# Patient Record
Sex: FEMALE | Race: WHITE
[De-identification: names, ages, dates, MRNs, and addresses within clinical notes are randomized per-mention and may not be internally consistent; named-entity substitution may affect disease eponyms.]

---

## 2018-09-28 ENCOUNTER — HOSPITAL ENCOUNTER (EMERGENCY)
Dept: HOSPITAL 26 - MED | Age: 28
Discharge: HOME | End: 2018-09-28
Payer: COMMERCIAL

## 2018-09-28 VITALS — BODY MASS INDEX: 27.76 KG/M2 | HEIGHT: 60 IN | WEIGHT: 141.38 LBS

## 2018-09-28 VITALS — DIASTOLIC BLOOD PRESSURE: 71 MMHG | SYSTOLIC BLOOD PRESSURE: 105 MMHG

## 2018-09-28 VITALS — SYSTOLIC BLOOD PRESSURE: 105 MMHG | DIASTOLIC BLOOD PRESSURE: 71 MMHG
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
## 2018-09-28 DIAGNOSIS — J45.909: ICD-10-CM

## 2018-09-28 DIAGNOSIS — S93.401A: Primary | ICD-10-CM
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
## 2018-09-28 PROCEDURE — 73610 X-RAY EXAM OF ANKLE: CPT

## 2018-09-28 PROCEDURE — 99284 EMERGENCY DEPT VISIT MOD MDM: CPT

## 2018-09-28 PROCEDURE — 96372 THER/PROPH/DIAG INJ SC/IM: CPT

## 2018-09-28 NOTE — NUR
Patient discharged with v/s stable. Written and verbal after care instructions 
given and explained. 

Patient alert, oriented and verbalized understanding of instructions. 
Ambulatory with steady gait. All questions addressed prior to discharge. ID 
band removed. Patient advised to follow up with PMD. Rx of motrin and norco 
given. Patient educated on indication of medication including possible reaction 
and side effects. Opportunity to ask questions provided and answered.

## 2018-09-28 NOTE — NUR
PT C/O RT ANKLE PAIN S/P FALL ON A STAIRS LAST WEDNESDAY AND ROLLED HER RT 
FOOT; ABLE TO AMBULATE TO BED 8. PATIENT STATES PAIN OF 7/10 AT THIS TIME; VSS; 
PATIENT POSITIONED FOR COMFORT; HOB ELEVATED; BEDRAILS UP X1; BED DOWN. ER MD 
MADE AWARE OF PT STATUS.

## 2018-11-29 ENCOUNTER — HOSPITAL ENCOUNTER (EMERGENCY)
Dept: HOSPITAL 26 - MED | Age: 28
Discharge: HOME | End: 2018-11-29
Payer: COMMERCIAL

## 2018-11-29 VITALS — BODY MASS INDEX: 26.5 KG/M2 | WEIGHT: 135 LBS | HEIGHT: 60 IN

## 2018-11-29 VITALS — SYSTOLIC BLOOD PRESSURE: 106 MMHG | DIASTOLIC BLOOD PRESSURE: 54 MMHG
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
## 2018-11-29 DIAGNOSIS — R56.9: ICD-10-CM

## 2018-11-29 DIAGNOSIS — F17.210: ICD-10-CM

## 2018-11-29 DIAGNOSIS — S00.532A: Primary | ICD-10-CM

## 2018-11-29 DIAGNOSIS — J45.909: ICD-10-CM

## 2018-11-29 DIAGNOSIS — S00.01XA: ICD-10-CM

## 2018-11-29 LAB
ALBUMIN FLD-MCNC: 3.5 G/DL (ref 3.4–5)
ANION GAP SERPL CALCULATED.3IONS-SCNC: 13.4 MMOL/L (ref 8–16)
AST SERPL-CCNC: 37 U/L (ref 15–37)
BARBITURATES UR QL SCN: (no result) NG/ML
BASOPHILS # BLD AUTO: 0 K/UL (ref 0–0.22)
BASOPHILS NFR BLD AUTO: 0.3 % (ref 0–2)
BENZODIAZ UR QL SCN: (no result) NG/ML
BILIRUB SERPL-MCNC: 0.8 MG/DL (ref 0–1)
BUN SERPL-MCNC: 13 MG/DL (ref 7–18)
BZE UR QL SCN: (no result) NG/ML
CANNABINOIDS UR QL SCN: (no result) NG/ML
CHLORIDE SERPL-SCNC: 104 MMOL/L (ref 98–107)
CO2 SERPL-SCNC: 23.8 MMOL/L (ref 21–32)
CREAT SERPL-MCNC: 0.7 MG/DL (ref 0.6–1.3)
EOSINOPHIL # BLD AUTO: 0.1 K/UL (ref 0–0.4)
EOSINOPHIL NFR BLD AUTO: 1 % (ref 0–4)
ERYTHROCYTE [DISTWIDTH] IN BLOOD BY AUTOMATED COUNT: 13.2 % (ref 11.6–13.7)
GFR SERPL CREATININE-BSD FRML MDRD: 129 ML/MIN (ref 90–?)
GLUCOSE SERPL-MCNC: 86 MG/DL (ref 74–106)
HCT VFR BLD AUTO: 36.8 % (ref 36–48)
HGB BLD-MCNC: 12.4 G/DL (ref 12–16)
LYMPHOCYTES # BLD AUTO: 1.1 K/UL (ref 2.5–16.5)
LYMPHOCYTES NFR BLD AUTO: 17.2 % (ref 20.5–51.1)
MCH RBC QN AUTO: 30 PG (ref 27–31)
MCHC RBC AUTO-ENTMCNC: 34 G/DL (ref 33–37)
MCV RBC AUTO: 88.5 FL (ref 80–94)
MONOCYTES # BLD AUTO: 0.5 K/UL (ref 0.8–1)
MONOCYTES NFR BLD AUTO: 8 % (ref 1.7–9.3)
NEUTROPHILS # BLD AUTO: 4.8 K/UL (ref 1.8–7.7)
NEUTROPHILS NFR BLD AUTO: 73.5 % (ref 42.2–75.2)
OPIATES UR QL SCN: (no result) NG/ML
PCP UR QL SCN: (no result) NG/ML
PLATELET # BLD AUTO: 358 K/UL (ref 140–450)
POTASSIUM SERPL-SCNC: 4.2 MMOL/L (ref 3.5–5.1)
RBC # BLD AUTO: 4.15 MIL/UL (ref 4.2–5.4)
SODIUM SERPL-SCNC: 137 MMOL/L (ref 136–145)
WBC # BLD AUTO: 6.6 K/UL (ref 4.8–10.8)

## 2018-11-29 PROCEDURE — 80305 DRUG TEST PRSMV DIR OPT OBS: CPT

## 2018-11-29 PROCEDURE — 81002 URINALYSIS NONAUTO W/O SCOPE: CPT

## 2018-11-29 PROCEDURE — 70450 CT HEAD/BRAIN W/O DYE: CPT

## 2018-11-29 PROCEDURE — 99284 EMERGENCY DEPT VISIT MOD MDM: CPT

## 2018-11-29 PROCEDURE — 96374 THER/PROPH/DIAG INJ IV PUSH: CPT

## 2018-11-29 PROCEDURE — 36415 COLL VENOUS BLD VENIPUNCTURE: CPT

## 2018-11-29 PROCEDURE — 85025 COMPLETE CBC W/AUTO DIFF WBC: CPT

## 2018-11-29 PROCEDURE — 80053 COMPREHEN METABOLIC PANEL: CPT

## 2018-11-29 PROCEDURE — 81025 URINE PREGNANCY TEST: CPT
